# Patient Record
Sex: FEMALE | Race: WHITE | Employment: OTHER | URBAN - METROPOLITAN AREA
[De-identification: names, ages, dates, MRNs, and addresses within clinical notes are randomized per-mention and may not be internally consistent; named-entity substitution may affect disease eponyms.]

---

## 2021-01-05 ENCOUNTER — OFFICE VISIT (OUTPATIENT)
Dept: URGENT CARE | Facility: CLINIC | Age: 81
End: 2021-01-05
Payer: MEDICARE

## 2021-01-05 VITALS
HEIGHT: 59 IN | DIASTOLIC BLOOD PRESSURE: 65 MMHG | HEART RATE: 75 BPM | BODY MASS INDEX: 23.18 KG/M2 | RESPIRATION RATE: 18 BRPM | OXYGEN SATURATION: 97 % | SYSTOLIC BLOOD PRESSURE: 155 MMHG | TEMPERATURE: 97 F | WEIGHT: 115 LBS

## 2021-01-05 DIAGNOSIS — L50.9 URTICARIA: Primary | ICD-10-CM

## 2021-01-05 PROCEDURE — 99204 OFFICE O/P NEW MOD 45 MIN: CPT | Mod: S$GLB,,, | Performed by: PHYSICIAN ASSISTANT

## 2021-01-05 PROCEDURE — 99204 PR OFFICE/OUTPT VISIT, NEW, LEVL IV, 45-59 MIN: ICD-10-PCS | Mod: S$GLB,,, | Performed by: PHYSICIAN ASSISTANT

## 2021-01-05 RX ORDER — ASPIRIN 81 MG/1
81 TABLET ORAL DAILY
COMMUNITY

## 2021-01-05 RX ORDER — METHYLPREDNISOLONE 4 MG/1
TABLET ORAL
Qty: 1 PACKAGE | Refills: 0 | Status: SHIPPED | OUTPATIENT
Start: 2021-01-05

## 2021-01-05 RX ORDER — FAMOTIDINE 40 MG/1
40 TABLET, FILM COATED ORAL DAILY
Qty: 5 TABLET | Refills: 0 | Status: SHIPPED | OUTPATIENT
Start: 2021-01-05 | End: 2021-01-10

## 2021-01-05 RX ORDER — ROSUVASTATIN CALCIUM 20 MG/1
20 TABLET, COATED ORAL DAILY
COMMUNITY

## 2025-05-20 ENCOUNTER — OFFICE VISIT (OUTPATIENT)
Dept: URGENT CARE | Facility: CLINIC | Age: 85
End: 2025-05-20
Payer: MEDICARE

## 2025-05-20 VITALS
RESPIRATION RATE: 16 BRPM | BODY MASS INDEX: 21.14 KG/M2 | HEIGHT: 60 IN | SYSTOLIC BLOOD PRESSURE: 169 MMHG | WEIGHT: 107.69 LBS | DIASTOLIC BLOOD PRESSURE: 70 MMHG | HEART RATE: 73 BPM | OXYGEN SATURATION: 97 % | TEMPERATURE: 101 F

## 2025-05-20 DIAGNOSIS — B97.89 VIRAL LOWER RESPIRATORY TRACT INFECTION: Primary | ICD-10-CM

## 2025-05-20 DIAGNOSIS — R03.0 ELEVATED BLOOD PRESSURE READING IN OFFICE WITHOUT DIAGNOSIS OF HYPERTENSION: ICD-10-CM

## 2025-05-20 DIAGNOSIS — J22 VIRAL LOWER RESPIRATORY TRACT INFECTION: Primary | ICD-10-CM

## 2025-05-20 DIAGNOSIS — R05.9 COUGH, UNSPECIFIED TYPE: ICD-10-CM

## 2025-05-20 DIAGNOSIS — R52 GENERALIZED BODY ACHES: ICD-10-CM

## 2025-05-20 DIAGNOSIS — R50.9 FEVER, UNSPECIFIED FEVER CAUSE: ICD-10-CM

## 2025-05-20 LAB
CTP QC/QA: YES
POC MOLECULAR INFLUENZA A AGN: NEGATIVE
POC MOLECULAR INFLUENZA B AGN: NEGATIVE
RSV RAPID ANTIGEN: NEGATIVE
SARS-COV+SARS-COV-2 AG RESP QL IA.RAPID: NEGATIVE

## 2025-05-20 PROCEDURE — 99203 OFFICE O/P NEW LOW 30 MIN: CPT | Mod: S$GLB,,, | Performed by: PHYSICIAN ASSISTANT

## 2025-05-20 PROCEDURE — 87811 SARS-COV-2 COVID19 W/OPTIC: CPT | Mod: QW,S$GLB,, | Performed by: PHYSICIAN ASSISTANT

## 2025-05-20 PROCEDURE — 87807 RSV ASSAY W/OPTIC: CPT | Mod: QW,S$GLB,, | Performed by: PHYSICIAN ASSISTANT

## 2025-05-20 PROCEDURE — 87502 INFLUENZA DNA AMP PROBE: CPT | Mod: QW,S$GLB,, | Performed by: PHYSICIAN ASSISTANT

## 2025-05-20 RX ORDER — ACETAMINOPHEN 325 MG/1
650 TABLET ORAL
Status: COMPLETED | OUTPATIENT
Start: 2025-05-20 | End: 2025-05-20

## 2025-05-20 RX ORDER — BENZONATATE 100 MG/1
100 CAPSULE ORAL 3 TIMES DAILY PRN
Qty: 30 CAPSULE | Refills: 0 | Status: SHIPPED | OUTPATIENT
Start: 2025-05-20 | End: 2025-05-30

## 2025-05-20 RX ADMIN — ACETAMINOPHEN 650 MG: 325 TABLET ORAL at 03:05

## 2025-05-20 NOTE — PROGRESS NOTES
Subjective:      Patient ID: Venice Wilburn is a 84 y.o. female.    Vitals:  height is 5' (1.524 m) and weight is 48.9 kg (107 lb 11.1 oz). Her tympanic temperature is 100.9 °F (38.3 °C) (abnormal). Her blood pressure is 169/70 (abnormal) and her pulse is 73. Her respiration is 16 and oxygen saturation is 97%.     Chief Complaint: Cough    Onset of sxs 05/19/25. Pt is c/o non productive cough, chills, fever and body aches. Pt has taken mucinex and tylenol to alleviate sxs with no relief. Pt's last dose of tylenol was around 7 am this morning. Denies congestion, sore throat, ear pain, headache, chest pain, sob/wheezing. PMH of HLD, CAD s/p bypass in 2002. BP elevated upon arrival. Denies hx of HTN.     Cough  This is a new problem. The current episode started yesterday. The problem has been unchanged. The cough is Non-productive. Associated symptoms include chills, a fever and myalgias. Pertinent negatives include no chest pain, ear pain, headaches, hemoptysis, nasal congestion, sore throat, shortness of breath, sweats or wheezing. Nothing aggravates the symptoms. Treatments tried: mucinex,tylenol. The treatment provided no relief.       Constitution: Positive for chills and fever.   HENT:  Negative for ear pain, congestion and sore throat.    Cardiovascular:  Negative for chest pain, leg swelling, palpitations and sob on exertion.   Respiratory:  Positive for cough. Negative for sputum production, bloody sputum, shortness of breath, wheezing and asthma.    Gastrointestinal: Negative.    Musculoskeletal:  Positive for muscle ache.   Allergic/Immunologic: Negative for asthma.   Neurological:  Negative for dizziness, passing out, headaches, numbness and tingling.      Objective:     Physical Exam   Constitutional: She appears well-developed.  Non-toxic appearance. She does not appear ill. No distress.   HENT:   Head: Normocephalic and atraumatic.   Ears:   Right Ear: Tympanic membrane, external ear and ear canal  normal.   Left Ear: Tympanic membrane, external ear and ear canal normal.   Nose: Nose normal.   Mouth/Throat: Mucous membranes are moist. Oropharynx is clear.   Eyes: Conjunctivae and EOM are normal.   Neck: Neck supple.   Cardiovascular: Normal rate, regular rhythm and normal heart sounds.   Pulmonary/Chest: Effort normal and breath sounds normal. No respiratory distress.   Abdominal: Normal appearance.   Musculoskeletal: Normal range of motion.         General: Normal range of motion.      Right lower leg: No edema.      Left lower leg: No edema.   Lymphadenopathy:     She has no cervical adenopathy.   Neurological: no focal deficit. She is alert. She displays no weakness. Gait normal.   Skin: Skin is warm, dry, not diaphoretic, not pale and no rash.   Psychiatric: Her behavior is normal.       Assessment:     1. Viral lower respiratory tract infection    2. Cough, unspecified type    3. Fever, unspecified fever cause    4. Generalized body aches    5. Elevated blood pressure reading in office without diagnosis of hypertension        Plan:       Viral lower respiratory tract infection  -     benzonatate (TESSALON) 100 MG capsule; Take 1 capsule (100 mg total) by mouth 3 (three) times daily as needed for Cough.  Dispense: 30 capsule; Refill: 0    Cough, unspecified type  -     SARS Coronavirus 2 Antigen, POCT Manual Read  -     POCT Influenza A/B MOLECULAR  -     POCT respiratory syncytial virus  -     benzonatate (TESSALON) 100 MG capsule; Take 1 capsule (100 mg total) by mouth 3 (three) times daily as needed for Cough.  Dispense: 30 capsule; Refill: 0    Fever, unspecified fever cause  -     SARS Coronavirus 2 Antigen, POCT Manual Read  -     POCT Influenza A/B MOLECULAR  -     acetaminophen tablet 650 mg  -     POCT respiratory syncytial virus    Generalized body aches  -     SARS Coronavirus 2 Antigen, POCT Manual Read  -     POCT Influenza A/B MOLECULAR  -     POCT respiratory syncytial virus    Elevated  blood pressure reading in office without diagnosis of hypertension          Medical Decision Making:   History:   Old Medical Records: I decided to obtain old medical records.  Old Records Summarized: records from clinic visits.       <> Summary of Records: Previous PCP notes reviewed; no elevated BP reading documented at last 3 visits  Differential Diagnosis:   COVID, FLU, RSV, other viral URI, bacterial pneumonia  Clinical Tests:   Lab Tests: Ordered and Reviewed  Urgent Care Management:  BP continued to lower during visit, although still elevated. Possibly related to fever/chills. Continue to monitor and eat healthy diet. Pt denies any cp, sob or headache. Pulse, RR and spO2 within normal limits and lungs clear to auscultation. Symptoms likely viral. Supportive care for symptoms. Tylenol q4-6 hrs prn for fever. Tessalon perles vs Mucinex prn for cough.  Rest and drink plenty of fluids.  Patient can return to clinic or follow up with PCP if symptoms worsen or fail to improve. Pt voiced understanding and all questions were answered prior to discharge.

## 2025-05-20 NOTE — PATIENT INSTRUCTIONS
General Instructions for Upper Respiratory Infection (URI):    What is a URI?   An upper respiratory infection (URI), also known as the common cold, is an acute infection of the head and chest. Generally, it affects the nose, throat, airways, sinuses and/or ears. URIs are typically caused by a virus and are among the most common diagnoses in Urgent Care.   While COVID and Flu are viruses most commonly tested for in Urgent Care, there are many other viruses that can cause similar symptoms such as: Respiratory Syncytial virus (RSV), Rhinovirus, Adenovirus, Enterovirus, Ney-Barr virus (EBV), human meta pneumovirus, Parvovirus B19 (Fifth's disease).     How long will it last?   Probably longer than youd like. Symptoms usually worsen during the first 3-5 days, followed by gradual improvement. Most URIs resolve within 10-14 days, even without treatment. People with URIs are most contagious during the first 2-3 days of symptoms and rarely after 1 week. However, a person can remain contagious for several days after symptoms subside.     Treatment   Antibiotics only work on bacterial infections, and will not treat a virus. Because URIs usually are caused by viruses, antibiotics are not an effective treatment.  If taken when not needed, antibiotics can be harmful and can expose you to unnecessary side effects such as allergic reaction (rash, anaphylaxis), yeast infection, nausea, vomiting, diarrhea, Clostridioides difficile (C. diff), immune dysregulation, longer illness and antibiotic resistance. Fortunately, there are many options that help alleviate symptoms when used as directed. The treatments below can help you feel better while your body's own defenses are fighting the virus.    Fever and/or Pain:    Use a pain reliever, such as acetaminophen (Tylenol) or Ibuprofen (Advil). Avoid NSAIDs (Ibuprofen, Aleve, Motrin, Aspirin) if you are pregnant, have advanced kidney disease or history of stomach ulcers/bleeding.      "Dosages listed below are recommended for the average size adult       Acetaminophen (Tylenol): 500mg-650mg every 4 hours, not to exceed 4000mg in 24 hours       Ibuprofen (Advil, Motrin): 400mg-600mg every 6 hours (take with food or eat at least 30 minutes before taking medication)    Nasal congestion, post nasal dripping, or sinus pressure:    Use an oral decongestant, such as pseudoephedrine or Mucinex D to reduce nasal and sinus congestion. Decongestants are available at pharmacies. Decongestants should not be used by individuals with certain medical conditions such as hypertension (high blood pressure) or any history of heart palpitations. If you DO have Hypertension or palpitations, it is safe to take Coricidin HBP for relief of sinus symptoms.   Nasal sprays, such as fluticasone (Flonase), can help relief sneezing, runny nose and nasal congestion, and may take up to one week of consistent use to manage symptoms.     Nasal rinsing with saline nasal spray or salt water (e.g., neti pot) can help relieve nasal dryness.    Use a warm mist humidifier or take a steamy shower to increase moisture in the air and soothe oral and nasal tissues that become irritated with dry air.    Non-drowsy antihistamines during the day, such as Zyrtec, Claritin, Allegra may help with runny nose, post nasal drip, and sneezing. Benadryl can be used at night as needed, unless you are already taking a "night-time" cold medication.   Vicks vapor rub may be helpful to use at night.    Zantac will help if there is reflux from the post nasal drip and helpful to take at night.    Sore throat:    Use a pain reliever, such as acetaminophen (Tylenol) or Ibuprofen (Advil).    Gargle with salt water (1/2 tsp of salt dissolved in 8 oz of warm water). Salt water can be used as often as you like.    Throat lozenges or throat sprays (Cepacol lozenges or Chloroseptic spray) may bring some relief by increasing saliva production and lubricating your " throat.    Drink plenty of fluids (e.g., water, diluted juice, tea) to soothe your throat and to stay well hydrated. Honey/lemon water or warm tea may be soothing.    Coughing:    Coughing often occurs during the later stages of a URI. It may be dry or produce phlegm or mucus.    Medications that contain dextromethorphan (helps to suppress a cough) and/or Guaifenesin (thins mucus and relieves chest congestion). Examples that include both are Robitussin DM, Mucinex DM, Delsym. Guaifenesin works best when you drink plenty of water.     Tea with honey, when taken regularly, can soothe a sore throat and help suppress a cough.    Cough drops   Vicks vapor rub and/or humidifier at night    Take care with medications    Be familiar with the individual ingredients in every medication you take, so you treat your symptoms appropriately.    Watch for ingredient overlap between products (e.g., acetaminophen, ibuprofen, pseudoephedrine). Dont accidentally take too much of any ingredient.    Dont take medications longer than recommended.    Follow any specific instructions given by your health care provider. This is especially important if you have an underlying health condition.    If you have questions or concerns, ask a pharmacist for assistance or consult with your health care provider. Note: generic medications contain the same active ingredients as name brands.     Strengthen your immune system   Make sure you eat well (e.g., a healthy, balanced variety of foods).    Avoid alcohol as it can directly suppress various immune responses.    Stay away from cigarettes, and second hand smoke.    Rest as much as possible and get plenty of sleep (at least 8 hours).     Cold-eeze (Zinc), Elderberry, Emergen-C, may help to reduce the duration of URI symptoms if taken early.    Reduce risk of spreading URI to others    URI infections are contagious; help reduce the spread.    Wash your hands frequently and/or use a hand ,  especially after touching your face or covering cough.    Cover your mouth when coughing or sneezing.    Avoid sharing items like cups and lip balm.     When to seek help    Sometimes upper respiratory infections become worse instead of better. Secondary bacterial infections or other complications can develop that require further treatment. Dont delay seeking medical evaluation if you experience any of the following symptoms:    A fever greater than 101°F for longer than 3 days.    Breathing problems like feeling short of breath, having pain or tightness in your chest, or wheezing (high pitched whistling sounds when you breath in)    A cough that is painful, worsening, or lasting longer than two weeks.    A bad sore throat that lasts longer than three days, or worsens.    Very swollen glands in your neck that arent going away    Pain in your face or teeth that is not improving after a few days of decongestant use    A headache that lasts longer than a few days or is very severe    A new rash    Persistent abdominal pain    Inability to tolerate oral fluids without vomiting   Severe weakness, dizziness, or passing out   Significant drowsiness or confusion     Please follow up with your primary care provider if your signs and symptoms have not resolved after 7-10 days or sooner if symptoms worsen.   If your condition worsens or fails to improve we recommend that you receive another evaluation at the emergency  room immediately or contact your primary medical clinic to discuss your concerns.   You must understand that you have received Urgent Care treatment only and that you may be released before all of  your medical problems are known or treated. You, the patient, are responsible to arrange for follow up care as instructed.    More information    Medicine Plus: nlm.nih.gov/medlineplus/ commoncold.html    Centers for Disease Control &Prevention: cdc.gov/getsmart/community/ materials-references/print-materials/  hcp/adult-tract-infection.pdf